# Patient Record
Sex: MALE | Race: BLACK OR AFRICAN AMERICAN
[De-identification: names, ages, dates, MRNs, and addresses within clinical notes are randomized per-mention and may not be internally consistent; named-entity substitution may affect disease eponyms.]

---

## 2016-12-18 VITALS
SYSTOLIC BLOOD PRESSURE: 110 MMHG | DIASTOLIC BLOOD PRESSURE: 71 MMHG | SYSTOLIC BLOOD PRESSURE: 110 MMHG | SYSTOLIC BLOOD PRESSURE: 110 MMHG | DIASTOLIC BLOOD PRESSURE: 71 MMHG | DIASTOLIC BLOOD PRESSURE: 71 MMHG

## 2017-03-20 ENCOUNTER — HOSPITAL ENCOUNTER (OUTPATIENT)
Dept: HOSPITAL 24 - RAD | Age: 42
End: 2017-03-20
Attending: NURSE PRACTITIONER
Payer: COMMERCIAL

## 2017-03-20 DIAGNOSIS — M51.37: ICD-10-CM

## 2017-03-20 DIAGNOSIS — M54.5: Primary | ICD-10-CM

## 2017-03-20 DIAGNOSIS — M25.551: ICD-10-CM

## 2017-03-20 DIAGNOSIS — R26.89: ICD-10-CM

## 2017-03-20 PROCEDURE — 72100 X-RAY EXAM L-S SPINE 2/3 VWS: CPT

## 2017-03-20 PROCEDURE — 73501 X-RAY EXAM HIP UNI 1 VIEW: CPT

## 2017-12-27 ENCOUNTER — HOSPITAL ENCOUNTER (OUTPATIENT)
Dept: HOSPITAL 24 - RAD | Age: 42
End: 2017-12-27
Attending: INTERNAL MEDICINE
Payer: COMMERCIAL

## 2017-12-27 DIAGNOSIS — M54.2: Primary | ICD-10-CM

## 2017-12-27 DIAGNOSIS — M50.321: ICD-10-CM

## 2017-12-27 PROCEDURE — 72050 X-RAY EXAM NECK SPINE 4/5VWS: CPT

## 2017-12-27 NOTE — RAD
Examination: Cervical spine, five views



History: Pain in neck and right shoulder



Findings: There is straightening of the normal curvature. Disc space narrowing with marginal osteophy
te formation noted at multiple levels, especially C4-5-6. Alignment is normal. There is arthritic def
ormity of the lower uncinate processes. Osteophytes impinge on the left neural foramina at C4-5-6. Th
e odontoid is intact.



Impression:



1. Multilevel degenerative disc disease, spondylosis and uncinate process osteoarthritis.



2. Degenerative narrowing of left neural foramina at C4-6.



3. Alteration in cervical curvature may reflect neck pain or muscle spasm.



Reported By:Electronically Signed by BROOKE PANDEY MD at 12/27/2017 12:07:00 PM

## 2018-02-21 ENCOUNTER — HOSPITAL ENCOUNTER (OUTPATIENT)
Dept: HOSPITAL 24 - RAD | Age: 43
Discharge: HOME | DRG: 556 | End: 2018-02-21
Attending: INTERNAL MEDICINE
Payer: COMMERCIAL

## 2018-02-21 DIAGNOSIS — M19.012: ICD-10-CM

## 2018-02-21 DIAGNOSIS — M25.512: Primary | ICD-10-CM

## 2018-02-21 PROCEDURE — 73221 MRI JOINT UPR EXTREM W/O DYE: CPT

## 2018-02-21 NOTE — MRI
MR left shoulder without contrast



Indication: Left shoulder pain and popping



Comparison: No recent priors



Technique: Multiplanar multi sequence imaging through the left shoulder without contrast



Findings: Bone marrow signal is normal. There is mild AC joint degenerative change. Mild glenohumeral
 DJD seen. No muscle belly atrophy seen. Neurovascular structures are normal.



There is trace subacromial/subdeltoid bursal fluid. There is mild supraspinatus and infraspinatus ten
dinosis. Subscapularis is intact. Intraarticular biceps tendon is normal. There is edema at the anter
ior greater tuberosity combo with small focal tear at the footprint of the supraspinatus seen on [place]
nal image [id number], compatible with low-grade articular surface footprint tear. High intrasubstance signal
 is seen at the the posterior supraspinatus/anterior infraspinatus junction on coronal images 11 thro
ugh 9 common possibly reflecting small intrasubstance tear. Tendinosis would appear similarly.



There is motion artifact on the axial series, but there is no displaced glenoid labrum tear seen. Mil
d fraying of the superior glenoid labrum noted.



Impression:

1. Focal articular sided footprint tear of the anterior supraspinatus with adjacent subchondral edema
.



2. Intrasubstance tearing versus tendinosis of the anterior infraspinatus/posterior supraspinatus.



3. Mild AC joint degenerative changes subacromial/subdeltoid mild bursitis. Mild degenerative glenoid
 labrum fraying without displaced tear.



Reported By:Electronically Signed by KAYLA TAM MD at 2/21/2018 5:32:28 PM